# Patient Record
Sex: FEMALE | Race: WHITE | NOT HISPANIC OR LATINO | Employment: OTHER | URBAN - METROPOLITAN AREA
[De-identification: names, ages, dates, MRNs, and addresses within clinical notes are randomized per-mention and may not be internally consistent; named-entity substitution may affect disease eponyms.]

---

## 2017-07-05 DIAGNOSIS — I65.23 CAROTID STENOSIS, BILATERAL: Primary | ICD-10-CM

## 2017-10-06 ENCOUNTER — OFFICE VISIT (OUTPATIENT)
Dept: CARDIOLOGY | Facility: CLINIC | Age: 72
End: 2017-10-06

## 2017-10-06 ENCOUNTER — DOCUMENTATION (OUTPATIENT)
Dept: CARDIOLOGY | Facility: CLINIC | Age: 72
End: 2017-10-06

## 2017-10-06 ENCOUNTER — HOSPITAL ENCOUNTER (OUTPATIENT)
Dept: CARDIOLOGY | Facility: HOSPITAL | Age: 72
Discharge: HOME | End: 2017-10-06
Attending: INTERNAL MEDICINE | Admitting: INTERNAL MEDICINE

## 2017-10-06 VITALS
WEIGHT: 116.2 LBS | HEART RATE: 68 BPM | HEIGHT: 65 IN | DIASTOLIC BLOOD PRESSURE: 70 MMHG | BODY MASS INDEX: 19.36 KG/M2 | SYSTOLIC BLOOD PRESSURE: 160 MMHG

## 2017-10-06 DIAGNOSIS — I73.9 PERIPHERAL VASCULAR DISEASE (HCC): ICD-10-CM

## 2017-10-06 DIAGNOSIS — I11.9 HYPERTENSIVE HEART DISEASE WITHOUT HEART FAILURE: Primary | ICD-10-CM

## 2017-10-06 DIAGNOSIS — I65.23 CAROTID STENOSIS, BILATERAL: ICD-10-CM

## 2017-10-06 DIAGNOSIS — I10 ESSENTIAL HYPERTENSION: ICD-10-CM

## 2017-10-06 DIAGNOSIS — R00.2 PALPITATIONS: ICD-10-CM

## 2017-10-06 DIAGNOSIS — E78.5 DYSLIPIDEMIA: ICD-10-CM

## 2017-10-06 DIAGNOSIS — Z72.0 TOBACCO ABUSE: ICD-10-CM

## 2017-10-06 LAB
BH CV XLRA MEAS LEFT DIST CCA EDV: 19 CM/SEC
BH CV XLRA MEAS LEFT DIST CCA PSV: 74 CM/SEC
BH CV XLRA MEAS LEFT DIST ICA EDV: 23 CM/SEC
BH CV XLRA MEAS LEFT DIST ICA PSV: 81 CM/SEC
BH CV XLRA MEAS LEFT ICA/CCA RATIO: 1.4
BH CV XLRA MEAS LEFT MID ICA EDV: 29 CM/SEC
BH CV XLRA MEAS LEFT MID ICA PSV: 102 CM/SEC
BH CV XLRA MEAS LEFT PROX CCA EDV: 17 CM/SEC
BH CV XLRA MEAS LEFT PROX CCA PSV: 95 CM/SEC
BH CV XLRA MEAS LEFT PROX ECA EDV: 23 CM/SEC
BH CV XLRA MEAS LEFT PROX ECA PSV: 189 CM/SEC
BH CV XLRA MEAS LEFT PROX ICA EDV: 16 CM/SEC
BH CV XLRA MEAS LEFT PROX ICA PSV: 92 CM/SEC
BH CV XLRA MEAS LEFT PROX SCLA EDV: 1 CM/SEC
BH CV XLRA MEAS LEFT PROX SCLA PSV: 134 CM/SEC
BH CV XLRA MEAS LEFT VERTEBRAL A EDV: 18 CM/SEC
BH CV XLRA MEAS LEFT VERTEBRAL A PSV: 63 CM/SEC
BH CV XLRA MEAS RIGHT DIST CCA EDV: 15 CM/SEC
BH CV XLRA MEAS RIGHT DIST CCA PSV: 78 CM/SEC
BH CV XLRA MEAS RIGHT DIST ICA EDV: 27 CM/SEC
BH CV XLRA MEAS RIGHT DIST ICA PSV: 113 CM/SEC
BH CV XLRA MEAS RIGHT ICA/CCA RATIO: 1.4
BH CV XLRA MEAS RIGHT MID ICA EDV: 18 CM/SEC
BH CV XLRA MEAS RIGHT MID ICA PSV: 80 CM/SEC
BH CV XLRA MEAS RIGHT PROX CCA EDV: 13 CM/SEC
BH CV XLRA MEAS RIGHT PROX CCA PSV: 99 CM/SEC
BH CV XLRA MEAS RIGHT PROX ECA EDV: 17 CM/SEC
BH CV XLRA MEAS RIGHT PROX ECA PSV: 129 CM/SEC
BH CV XLRA MEAS RIGHT PROX ICA EDV: 20 CM/SEC
BH CV XLRA MEAS RIGHT PROX ICA PSV: 113 CM/SEC
BH CV XLRA MEAS RIGHT PROX SCLA EDV: 1 CM/SEC
BH CV XLRA MEAS RIGHT PROX SCLA PSV: 117 CM/SEC
BH CV XLRA MEAS RIGHT VERTEBRAL A EDV: 11 CM/SEC
BH CV XLRA MEAS RIGHT VERTEBRAL A PSV: 66 CM/SEC

## 2017-10-06 PROCEDURE — 93880 EXTRACRANIAL BILAT STUDY: CPT | Performed by: INTERNAL MEDICINE

## 2017-10-06 PROCEDURE — 93880 EXTRACRANIAL BILAT STUDY: CPT

## 2017-10-06 PROCEDURE — 99214 OFFICE O/P EST MOD 30 MIN: CPT | Performed by: INTERNAL MEDICINE

## 2017-10-06 RX ORDER — METOPROLOL SUCCINATE 50 MG/1
50 TABLET, EXTENDED RELEASE ORAL DAILY
COMMUNITY

## 2017-10-06 NOTE — PROGRESS NOTES
Subjective:     Encounter Date:10/06/2017    Patient ID: Rebeka Cummings is a 72 y.o.  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      SELF-REFERRED  INTERNIST: Sam Koroma MD  ALLERGIST: Julio Aranda MD    Chief Complaint:   Chief Complaint   Patient presents with   • Hypertension     Problem List:  1. Probable hypertensive cardiovascular disease:  a. Remote acceptable echocardiogram [facility], January 2011, with recent labile hypertensive blood pressure readings.   b. Remote acceptable echocardiogram with GXT declined (LVEF 0.65), July 2012.  c. Residual class I symptoms with intermittent palpitation and acceptable ZIO/XT monitor (December 2016).  2. Chronic hypertension -- probably essential, recent labile readings.  3. Dyslipidemia.   4. Remote tobacco abuse discontinued in 2010, with recurrence, March 2013.   5. Peripheral vascular disease:  a. Asymptomatic carotid bruits more notable on the left with remote abnormal carotid duplex study suggestive of possible severe LIC disease [facility], January 2011.  b. Remote abnormal carotid duplex demonstrating mild (20% to 40%) left ICA origin stenosis, July 2012.  c. Apparent stable by report, 09/24/2014.   d. Acceptable stable carotid duplex study, October 2017.  6. Remote anxiety with recent multiple family stressors, resolved.   7. Pelvis fracture in March 2015.   8. Remote operations:  a. Bilateral tubal ligation.  b. Minor dermatologic superficial/benign excisions.    Allergies   Allergen Reactions   • Crestor [Rosuvastatin Calcium] Other (See Comments) and Myalgia     Arthralgia     • Lipitor [Atorvastatin] Other (See Comments)     Dysphoria/memory problems   • Other Nausea And Vomiting     ANTIBIOTICS; multiple food and environmental allergies   • Pravastatin Myalgia   • Prednisone Nausea And Vomiting   • Demadex [Torsemide] Rash and Other (See Comments)     epistaxis   •  Penicillins Nausea And Vomiting and Rash   • Sulfa Antibiotics Nausea Only and Rash         Current Outpatient Prescriptions:   •  aspirin 81 MG tablet, Take 81 mg by mouth 3 (Three) Times a Week. Takes on Monday, Wednesday, & Friday, Disp: , Rfl:   •  lisinopril (PRINIVIL,ZESTRIL) 20 MG tablet, Take 20 mg by mouth Daily., Disp: , Rfl: 5  •  LORazepam (ATIVAN) 0.5 MG tablet, Take  by mouth 2 (Two) Times a Day., Disp: , Rfl:   •  metoprolol succinate XL (TOPROL-XL) 50 MG 24 hr tablet, Take 50 mg by mouth Daily., Disp: , Rfl:     HISTORY OF PRESENT ILLNESS: Patient returns for scheduled annual followup. She has done well since last being seen in our office.  She states that she has done well on the doubled dose of metoprolol succinate XL.  Her blood pressure is elevated today in our office and was also elevated when she had her carotid duplex done before her appointment, but she says that when she checked it at home this morning, her systolic reading was 134.  In the interim, she started having intermittent palpitation, so a ZIO/XT patch was ordered for her, and those results were good, as discussed with her by letter and today in the office.  The patient does not typically get influenza immunization, and the importance of this immunization is discussed with her today.  She hand carries laboratory results with her today, and these are reviewed with her in the office (see below).  She is told that we will send her a letter with the results of her carotid ultrasound.  She is leaving a week from tomorrow (10/14/2017) with her daughter and her sister for a trip to [place] and would like to be sure she is okay before they leave; she is assured that she will have our letter by that time outlining the results of her carotid ultrasound, but she is told that the preliminary results look good. The patient continues to smoke on a daily basis and is encouraged to completely stop.  She states that she had her feet checked, and  "that all looked good; she says that if we would like the report, they will send it to us.  Patient otherwise denies chest pain, shortness of breath, PND, edema, palpitations, syncope or presyncope at this time.          Review of Systems   Hematologic/Lymphatic: Bruises/bleeds easily.   Skin: Positive for dry skin.   Psychiatric/Behavioral: The patient is nervous/anxious.    Allergic/Immunologic: Positive for environmental allergies.        Food allergies      Obtained and otherwise negative except as outlined in problem list and HPI.    Procedures       Objective:       Vitals:    10/06/17 1246 10/06/17 1254 10/06/17 1304   BP: (!) 184/70 176/68 160/70   BP Location: Left arm Left arm Left arm   Patient Position: Sitting Standing Sitting   Pulse: 68     Weight: 116 lb 3.2 oz (52.7 kg)     Height: 65\" (165.1 cm)       Body mass index is 19.34 kg/(m^2).   Last weight:  115 lbs.    Physical Exam   Constitutional: She is oriented to person, place, and time. She appears well-developed and well-nourished.   Neck: No JVD present. Carotid bruit is present (bilateral). No thyromegaly present.   Cardiovascular: Regular rhythm, S1 normal and S2 normal.  Exam reveals no gallop, no S3 and no friction rub.    Murmur heard.   Medium-pitched early systolic murmur is present with a grade of 2/6  at the lower left sternal border  Pulses:       Carotid pulses are 1+ on the right side, and 1+ on the left side.       Radial pulses are 1+ on the right side, and 1+ on the left side.        Femoral pulses are 1+ on the right side, and 1+ on the left side.       Popliteal pulses are 1+ on the right side, and 1+ on the left side.        Dorsalis pedis pulses are 1+ on the right side, and 1+ on the left side.        Posterior tibial pulses are 1+ on the right side, and 1+ on the left side.   Pulmonary/Chest: Effort normal and breath sounds normal. She has no wheezes. She has no rhonchi. She has no rales.   Abdominal: Soft. She exhibits no " mass. There is no hepatosplenomegaly. There is no tenderness. There is no guarding.   Bowel sounds audible x4   Musculoskeletal: Normal range of motion. She exhibits no edema.   Lymphadenopathy:     She has no cervical adenopathy.   Neurological: She is alert and oriented to person, place, and time.   Skin: Skin is warm, dry and intact. No rash noted.   Vitals reviewed.        Lab Review: June 2017 (reviewed with patient in office)  · CMP - glucose 89, BUN 10, creatinine 0.69, GFR >60, sodium 135, potassium 4.7, calcium 10.1, protein 8.1, albumin 4.6 bilirubin 0.8  · Uric Acid - 4.5 mg/dL  · FLP - total cholesterol 161, triglycerides 101, HDL-C 45, LDL-C 96, non-HDL-C 116  · TSH - 1.25  · CBC - WBC 9.54        Assessment:   Overall continued acceptable course with no interim cardiopulmonary complaints with acceptable functional status. We will defer additional diagnostic or therapeutic intervention from a cardiac perspective at this time.       Diagnosis Plan   1. Hypertensive heart disease without heart failure  No recurrent angina pectoris or CHF.     2. Palpitations  Holter / Event ZIO Patch; performed 11/2016   3. Essential hypertension  Labile readings; will obtain 24-hour ambulatory blood pressure monitor   4. Peripheral vascular disease  Stable abnormal carotid duplex study   5. Dyslipidemia  Marginal control   6. Tobacco abuse  Total cessation strongly encouraged; declines Chantix          Plan:         1. Patient to continue current medications and close follow up with the above providers.  2. Complete smoking cessation is strongly encouraged.  3. Patient will continue to monitor her blood pressure at home and let us know if it is consistently elevated; she will return in 3-4 weeks for consideration of 24-hour ambulatory blood pressure monitor.  4. Tentative cardiology follow up in October 2018, or patient may return sooner PRN.       Transcribed by Torrie Garay for Dr. Rico Vila at 12:55 PM on  10/06/2017    I, Rico Vila MD, [facility], personally performed the services described in this documentation as scribed by the above named individual in my presence, and it is both accurate and complete. At 1:46 PM on 10/06/2017

## 2018-10-12 ENCOUNTER — OFFICE VISIT (OUTPATIENT)
Dept: CARDIOLOGY | Facility: CLINIC | Age: 73
End: 2018-10-12

## 2018-10-12 VITALS
SYSTOLIC BLOOD PRESSURE: 134 MMHG | HEART RATE: 78 BPM | WEIGHT: 112 LBS | BODY MASS INDEX: 18.66 KG/M2 | DIASTOLIC BLOOD PRESSURE: 62 MMHG | HEIGHT: 65 IN

## 2018-10-12 DIAGNOSIS — I11.9 HYPERTENSIVE HEART DISEASE WITHOUT HEART FAILURE: ICD-10-CM

## 2018-10-12 DIAGNOSIS — Z72.0 TOBACCO ABUSE: ICD-10-CM

## 2018-10-12 DIAGNOSIS — I65.23 ATHEROSCLEROSIS OF BOTH CAROTID ARTERIES: Primary | ICD-10-CM

## 2018-10-12 DIAGNOSIS — I10 ESSENTIAL HYPERTENSION: ICD-10-CM

## 2018-10-12 DIAGNOSIS — E78.5 DYSLIPIDEMIA: ICD-10-CM

## 2018-10-12 PROCEDURE — 99214 OFFICE O/P EST MOD 30 MIN: CPT | Performed by: INTERNAL MEDICINE

## 2018-10-12 RX ORDER — CETIRIZINE HYDROCHLORIDE 10 MG/1
10 TABLET ORAL AS NEEDED
COMMUNITY
End: 2019-10-30

## 2018-10-12 NOTE — PROGRESS NOTES
Subjective:     Encounter Date:10/12/2018    Patient ID: Rebeka Cummings is a 73 y.o.  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      SELF-REFERRED  INTERNIST: Sam Koroma MD  ALLERGIST: Julio Aranda MD    Chief Complaint:   Chief Complaint   Patient presents with   • Hypertension     Problem List:  1. Probable hypertensive cardiovascular disease:  a. Remote acceptable echocardiogram [facility], January 2011, with recent labile hypertensive blood pressure readings.   b. Remote acceptable echocardiogram with GXT declined (LVEF 0.65), July 2012.  c. Residual class I symptoms with intermittent palpitation and acceptable ZIO/XT monitor (December 2016).  2. Chronic hypertension -- probably essential, recent labile readings.  3. Dyslipidemia.   4. Remote tobacco abuse discontinued in 2010, with recurrence, March 2013.   5. Peripheral vascular disease:  a. Asymptomatic carotid bruits more notable on the left with remote abnormal carotid duplex study suggestive of possible severe LIC disease [facility], January 2011.  b. Remote abnormal carotid duplex demonstrating mild (20% to 40%) left ICA origin stenosis, July 2012.  c. Apparent stable by report, 09/24/2014.   d. Acceptable stable carotid duplex study, October 2017.  6. Remote anxiety with multiple family stressors, resolved.   7. Pelvis fracture in March 2015.   8. Remote operations:  a. Bilateral tubal ligation.  b. Minor dermatologic superficial/benign excisions.    Allergies   Allergen Reactions   • Crestor [Rosuvastatin Calcium] Other (See Comments) and Myalgia     Arthralgia     • Lipitor [Atorvastatin] Other (See Comments)     Dysphoria/memory problems   • Other Nausea And Vomiting     ANTIBIOTICS; multiple food and environmental allergies   • Pravastatin Myalgia   • Prednisone Nausea And Vomiting   • Demadex [Torsemide] Rash and Other (See Comments)     epistaxis   • Penicillins  "Nausea And Vomiting and Rash   • Sulfa Antibiotics Nausea Only and Rash         Current Outpatient Prescriptions:   •  aspirin 81 MG tablet, Take 81 mg by mouth 3 (Three) Times a Week. Takes on Monday, Wednesday, & Friday, Disp: , Rfl:   •  cetirizine (zyrTEC) 10 MG tablet, Take 10 mg by mouth As Needed for Allergies., Disp: , Rfl:   •  lisinopril (PRINIVIL,ZESTRIL) 20 MG tablet, Take 20 mg by mouth Daily., Disp: , Rfl: 5  •  LORazepam (ATIVAN) 0.5 MG tablet, Take  by mouth 2 (Two) Times a Day., Disp: , Rfl:   •  metoprolol succinate XL (TOPROL-XL) 50 MG 24 hr tablet, Take 50 mg by mouth Daily., Disp: , Rfl:     HISTORY OF PRESENT ILLNESS: Patient returns for scheduled annual followup.  She has done well since last being seen in our office.  She stays busy and says \"I can't just sit down.\"  She sometimes does not even sit down to eat.  She continues to work with her  at their business.  She did not come back for a blood pressure monitor, and she says \"it's always perfect\" when she checks it at home.  She says it typically runs 125-130 systolic at home.  She has no symptoms from a cardiopulmonary perspective with her daily activities.  She had lab work drawn in Dr. Koroma's office over the summer and asked them to send those results to us; however, we have not received anything.  She will call their office and ask them to forward the results to our office.  The patient states that she was told \"everything was good.\"  She has not had a flu shot and \"never has.\"  The importance of influenza immunization is discussed with her.  The patient notes that she has a lot of stress in her life and that \"it doesn't take much to get me stressed.\"  She states that her sister-in-law  a couple of weeks ago in [place] so they had to travel for that, and her 69-year-old sister just recently had heart failure with placement of an AICD and also needs to have surgery for a kidney stone and has recently undergo " "catheterization studies with 2 stents placed.  Patient otherwise denies chest pain, shortness of breath, PND, edema, palpitations, syncope or presyncope at this time.        Review of Systems   Hematologic/Lymphatic: Bruises/bleeds easily.   Skin: Positive for dry skin, itching and rash.   Allergic/Immunologic: Positive for environmental allergies (food allergies).      Obtained and otherwise negative except as outlined in problem list and HPI.    Procedures       Objective:       Vitals:    10/12/18 1245 10/12/18 1252 10/12/18 1307   BP: 161/60 159/65 134/62   BP Location: Right arm Right arm Left arm   Patient Position: Sitting Standing Sitting   Pulse: 73 78    Weight: 50.8 kg (112 lb)     Height: 165.1 cm (65\")       Body mass index is 18.64 kg/m².   Last weight:  116 lbs.    Physical Exam   Constitutional: She is oriented to person, place, and time. She appears well-developed and well-nourished.   Neck: No JVD present. Carotid bruit is present (faint, left). No thyromegaly present.   Cardiovascular: Regular rhythm, S1 normal, S2 normal and normal heart sounds.  Exam reveals no gallop, no S3 and no friction rub.    No murmur heard.  Pulses:       Dorsalis pedis pulses are 2+ on the right side, and 2+ on the left side.        Posterior tibial pulses are 2+ on the right side, and 2+ on the left side.   Pulmonary/Chest: Effort normal. She has decreased breath sounds. She has no wheezes. She has no rhonchi. She has no rales.   Abdominal: Soft. She exhibits no mass. There is no hepatosplenomegaly. There is no tenderness. There is no guarding.   Bowel sounds audible x4   Musculoskeletal: Normal range of motion. She exhibits no edema.   Lymphadenopathy:     She has no cervical adenopathy.   Neurological: She is alert and oriented to person, place, and time.   Skin: Skin is warm, dry and intact. No rash noted.   Vitals reviewed.        Lab Review: No recent laboratory results available for review today    Carotid duplex " scan, 10/06/2017:    · Proximal right internal carotid artery plaque without significant stenosis.  · Right internal carotid artery stenosis of 0-49%.  · Proximal left internal carotid artery plaque without significant stenosis.  · Left internal carotid artery stenosis of 0-49%.          Assessment:   Overall continued acceptable course with no interim cardiopulmonary complaints with good functional status. We will defer additional diagnostic or therapeutic intervention from a cardiac perspective at this time.  Hopefully, we will be allowed to obtain and review her most recent laboratory results with her by letter.  She is to continue to monitor her blood pressure at home and let us know if her blood pressure consistently is at or above 140 torr systolic.       Diagnosis Plan   1. Atherosclerosis of both carotid arteries  Duplex Carotid Ultrasound CAR   2. Essential hypertension  Labile readings with overall acceptable control   3. Hypertensive heart disease without heart failure  No recurrent angina pectoris or CHF; continue current treatment     4. Dyslipidemia  No data to reivew   5. Tobacco abuse  I advised the patient of the risks of continuing to use tobacco, and I provided this patient with smoking cessation educational materials and discussed how to quit smoking and patient has not expressed the willingness to quit.  Counseled patient for 3-5 minutes            Plan:         1. Patient to continue current medications and close follow up with the above providers.  2. Influenza immunization is strongly recommended.  3. Complete smoking cessation is strongly encouraged.  4. Tentative cardiology follow up in October 2019 with carotid duplex study, or patient may return sooner PRN.    Transcribed by Torrie Garay for Dr. Rico Vila at 1:02 PM on 10/12/2018    IRico MD, [facility], personally performed the services described in this documentation as scribed by the above named individual in my  presence, and it is both accurate and complete. At 1:31 PM on 10/12/2018

## 2019-04-12 ENCOUNTER — TELEPHONE (OUTPATIENT)
Dept: CARDIOLOGY | Facility: CLINIC | Age: 74
End: 2019-04-12

## 2019-04-12 RX ORDER — TRIAMTERENE AND HYDROCHLOROTHIAZIDE 37.5; 25 MG/1; MG/1
TABLET ORAL
Qty: 30 TABLET | Refills: 11 | Status: SHIPPED | OUTPATIENT
Start: 2019-04-12 | End: 2019-10-30

## 2019-04-12 NOTE — TELEPHONE ENCOUNTER
Pt has had trouble with her BP since Jan. She went to the ED on 01/05/2019 and was put on Clonidine at time then switched to Amlodipine from the PCP. Amlodipine has been adjusted and currently takes 5 mg with controled BP & HR.      04/11  /65  HR 60  04/12  /61  HR 65    Updated List of Medication:    Amlodipine 5mg QD  Lisinopril 20 mg QD  Metoprolol 50 Mg QD  ASA 81 mg QD  Ativan 0.5 mg BID  Paxel 10 mg QD    Pt Now c/o Edema to the Lower Extremities.     Pt advised to continue to record BP and HR and Weight.

## 2019-06-17 DIAGNOSIS — I65.23 ATHEROSCLEROSIS OF BOTH CAROTID ARTERIES: Primary | ICD-10-CM

## 2019-10-30 ENCOUNTER — HOSPITAL ENCOUNTER (OUTPATIENT)
Dept: CARDIOLOGY | Facility: HOSPITAL | Age: 74
Discharge: HOME | End: 2019-10-30
Admitting: INTERNAL MEDICINE

## 2019-10-30 ENCOUNTER — OFFICE VISIT (OUTPATIENT)
Dept: CARDIOLOGY | Facility: CLINIC | Age: 74
End: 2019-10-30

## 2019-10-30 VITALS
HEART RATE: 57 BPM | BODY MASS INDEX: 18.33 KG/M2 | DIASTOLIC BLOOD PRESSURE: 60 MMHG | HEIGHT: 65 IN | SYSTOLIC BLOOD PRESSURE: 126 MMHG | WEIGHT: 110 LBS

## 2019-10-30 DIAGNOSIS — I10 ESSENTIAL HYPERTENSION: ICD-10-CM

## 2019-10-30 DIAGNOSIS — I11.9 HYPERTENSIVE HEART DISEASE WITHOUT HEART FAILURE: Primary | ICD-10-CM

## 2019-10-30 DIAGNOSIS — I73.9 PERIPHERAL VASCULAR DISEASE (HCC): ICD-10-CM

## 2019-10-30 DIAGNOSIS — Z72.0 TOBACCO ABUSE: ICD-10-CM

## 2019-10-30 DIAGNOSIS — E78.5 DYSLIPIDEMIA: ICD-10-CM

## 2019-10-30 DIAGNOSIS — I65.23 ATHEROSCLEROSIS OF BOTH CAROTID ARTERIES: ICD-10-CM

## 2019-10-30 LAB
BH CV ECHO MEAS - BSA(HAYCOCK): 1.5 M^2
BH CV ECHO MEAS - BSA: 1.5 M^2
BH CV ECHO MEAS - BZI_BMI: 18.6 KILOGRAMS/M^2
BH CV ECHO MEAS - BZI_METRIC_HEIGHT: 165.1 CM
BH CV ECHO MEAS - BZI_METRIC_WEIGHT: 50.8 KG
BH CV XLRA MEAS LEFT BULB EDV: 20.4 CM/SEC
BH CV XLRA MEAS LEFT BULB PSV: 123.4 CM/SEC
BH CV XLRA MEAS LEFT CCA RATIO VEL: 69.5 CM/SEC
BH CV XLRA MEAS LEFT DIST CCA EDV: 14.3 CM/SEC
BH CV XLRA MEAS LEFT DIST CCA PSV: 69.5 CM/SEC
BH CV XLRA MEAS LEFT DIST ICA EDV: 19.6 CM/SEC
BH CV XLRA MEAS LEFT DIST ICA PSV: 82.5 CM/SEC
BH CV XLRA MEAS LEFT ICA RATIO VEL: 135 CM/SEC
BH CV XLRA MEAS LEFT ICA/CCA RATIO: 1.9
BH CV XLRA MEAS LEFT MID CCA EDV: 13.8 CM/SEC
BH CV XLRA MEAS LEFT MID CCA PSV: 70 CM/SEC
BH CV XLRA MEAS LEFT MID ICA EDV: 17.7 CM/SEC
BH CV XLRA MEAS LEFT MID ICA PSV: 97.2 CM/SEC
BH CV XLRA MEAS LEFT PROX CCA EDV: 13.8 CM/SEC
BH CV XLRA MEAS LEFT PROX CCA PSV: 82.7 CM/SEC
BH CV XLRA MEAS LEFT PROX ECA EDV: 17.7 CM/SEC
BH CV XLRA MEAS LEFT PROX ECA PSV: 185.6 CM/SEC
BH CV XLRA MEAS LEFT PROX ICA EDV: 22.6 CM/SEC
BH CV XLRA MEAS LEFT PROX ICA PSV: 135.5 CM/SEC
BH CV XLRA MEAS LEFT PROX SCLA PSV: 158.3 CM/SEC
BH CV XLRA MEAS LEFT VERTEBRAL A EDV: 11.6 CM/SEC
BH CV XLRA MEAS LEFT VERTEBRAL A PSV: 61.2 CM/SEC
BH CV XLRA MEAS RIGHT BULB EDV: 15.7 CM/SEC
BH CV XLRA MEAS RIGHT BULB PSV: 109.4 CM/SEC
BH CV XLRA MEAS RIGHT CCA RATIO VEL: 72.9 CM/SEC
BH CV XLRA MEAS RIGHT DIST CCA EDV: 10.3 CM/SEC
BH CV XLRA MEAS RIGHT DIST CCA PSV: 85 CM/SEC
BH CV XLRA MEAS RIGHT DIST ICA EDV: 20.7 CM/SEC
BH CV XLRA MEAS RIGHT DIST ICA PSV: 111.3 CM/SEC
BH CV XLRA MEAS RIGHT ICA RATIO VEL: 126 CM/SEC
BH CV XLRA MEAS RIGHT ICA/CCA RATIO: 1.7
BH CV XLRA MEAS RIGHT MID CCA EDV: 12.7 CM/SEC
BH CV XLRA MEAS RIGHT MID CCA PSV: 73.3 CM/SEC
BH CV XLRA MEAS RIGHT MID ICA EDV: 18.9 CM/SEC
BH CV XLRA MEAS RIGHT MID ICA PSV: 111.3 CM/SEC
BH CV XLRA MEAS RIGHT PROX CCA EDV: 10.9 CM/SEC
BH CV XLRA MEAS RIGHT PROX CCA PSV: 61.6 CM/SEC
BH CV XLRA MEAS RIGHT PROX ECA EDV: 9.4 CM/SEC
BH CV XLRA MEAS RIGHT PROX ECA PSV: 117.9 CM/SEC
BH CV XLRA MEAS RIGHT PROX ICA EDV: 21.4 CM/SEC
BH CV XLRA MEAS RIGHT PROX ICA PSV: 127 CM/SEC
BH CV XLRA MEAS RIGHT PROX SCLA PSV: 112.4 CM/SEC
BH CV XLRA MEAS RIGHT VERTEBRAL A EDV: 13.8 CM/SEC
BH CV XLRA MEAS RIGHT VERTEBRAL A PSV: 52.1 CM/SEC
LEFT ARM BP: NORMAL MMHG
RIGHT ARM BP: NORMAL MMHG

## 2019-10-30 PROCEDURE — 93880 EXTRACRANIAL BILAT STUDY: CPT | Performed by: INTERNAL MEDICINE

## 2019-10-30 PROCEDURE — 99214 OFFICE O/P EST MOD 30 MIN: CPT | Performed by: INTERNAL MEDICINE

## 2019-10-30 PROCEDURE — 93880 EXTRACRANIAL BILAT STUDY: CPT

## 2019-10-30 RX ORDER — PAROXETINE 10 MG/1
10 TABLET, FILM COATED ORAL DAILY
Refills: 3 | COMMUNITY
Start: 2019-09-25
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
## 2019-10-31 ENCOUNTER — TELEPHONE (OUTPATIENT)
Dept: CARDIOLOGY | Facility: CLINIC | Age: 74
End: 2019-10-31

## 2019-10-31 NOTE — TELEPHONE ENCOUNTER
Per [facility], if pt takes Xarelto there is a 60% decrease in chance or stroke. Her plaque is more diffuse and can not be quantified.    Pt given [facility] recommendations above. Pt educated to stop smoking and advised of the dcrease in stroke risk.    Pt is still undecided about taking Xarelto and states she will call and let us know.

## 2019-10-31 NOTE — TELEPHONE ENCOUNTER
Called pt regarding carotid duplex result letter and starting Xarelto.     Pt would like to know how this carotid duplex study compares to the carotid duplex that she had in July 2017, and if starting Xarelto was 100% neccessary. Pt states that she already bleeds very easily on ASA 81mg daily and is hesitant to start Xarelto.    Please advise.

## 2019-10-31 NOTE — TELEPHONE ENCOUNTER
"Called pt and gave [facility]TS recommendations above.    Pt states that she does not want to take Xarelto. Educated pt on reasons and necessity of medication.    Pt states she will \"think about it\". Advised pt that she should start the medication as prescribed by [facility]TS.    Pt verbalizes understanding and says she will let me know if she decides to start Xarelto.      "

## 2019-10-31 NOTE — TELEPHONE ENCOUNTER
Noted; she appears to have more diffuse coronary artery atherosclerosis; I do feel Xarelto provides added protection without that much more increase in bleeding risk.    Thanks!

## 2021-02-26 ENCOUNTER — OFFICE VISIT (OUTPATIENT)
Dept: CARDIOLOGY | Facility: CLINIC | Age: 76
End: 2021-02-26

## 2021-02-26 VITALS
OXYGEN SATURATION: 99 % | DIASTOLIC BLOOD PRESSURE: 80 MMHG | SYSTOLIC BLOOD PRESSURE: 192 MMHG | HEART RATE: 65 BPM | HEIGHT: 65 IN | WEIGHT: 105 LBS | BODY MASS INDEX: 17.49 KG/M2

## 2021-02-26 DIAGNOSIS — I11.9 HYPERTENSIVE HEART DISEASE WITHOUT HEART FAILURE: Primary | ICD-10-CM

## 2021-02-26 DIAGNOSIS — Z72.0 TOBACCO ABUSE: ICD-10-CM

## 2021-02-26 DIAGNOSIS — I73.9 PERIPHERAL VASCULAR DISEASE (HCC): ICD-10-CM

## 2021-02-26 DIAGNOSIS — I10 ESSENTIAL HYPERTENSION: ICD-10-CM

## 2021-02-26 DIAGNOSIS — E78.5 DYSLIPIDEMIA: ICD-10-CM

## 2021-02-26 PROCEDURE — 99214 OFFICE O/P EST MOD 30 MIN: CPT | Performed by: INTERNAL MEDICINE

## 2021-02-26 RX ORDER — HYDRALAZINE HYDROCHLORIDE 10 MG/1
TABLET, FILM COATED ORAL
COMMUNITY
Start: 2021-02-23

## 2021-02-26 RX ORDER — IRBESARTAN 300 MG/1
TABLET ORAL
COMMUNITY
Start: 2021-02-08

## 2021-02-26 RX ORDER — CLONIDINE HYDROCHLORIDE 0.1 MG/1
TABLET ORAL
COMMUNITY
Start: 2021-02-24

## 2021-02-26 NOTE — PROGRESS NOTES
Subjective:     Encounter Date:02/26/2021    Patient ID: Rebeka Cummings is a 75 y.o.  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      SELF-REFERRED  INTERNIST: Sam Koroma MD  ALLERGIST: Julio Aranda MD  NEPHROLOGIST: in [place]    Chief Complaint:   Chief Complaint   Patient presents with   • Hypertension   • artherosclerosis       Problem List:  1. Probable hypertensive cardiovascular disease:  a. Remote acceptable echocardiogram [facility] [place], January 2011, with recent labile hypertensive blood pressure readings.   b. Remote acceptable echocardiogram with GXT declined (LVEF 0.65), July 2012.  c. Residual class I symptoms with intermittent palpitation and acceptable ZIO/XT monitor (December 2016).  2. Chronic hypertension -- probably essential, recent labile readings.  3. Dyslipidemia.   4. Remote tobacco abuse discontinued in 2010, with recurrence, March 2013, currently smoking 1 pack/day February 2021.   5. Peripheral vascular disease:  a. Asymptomatic carotid bruits more notable on the left with remote abnormal carotid duplex study suggestive of possible severe LIC disease [facility] [place], January 2011.  b. Remote abnormal carotid duplex demonstrating mild (20% to 40%) left ICA origin stenosis, July 2012.  c. Apparent stable by report, 09/24/2014.   d. Acceptable stable carotid duplex study, October 2017, October 2019.  6. Remote anxiety with multiple family stressors, resolved.   7. Pelvis fracture in March 2015.   8. Remote operations:  a. Bilateral tubal ligation.  b. Minor dermatologic superficial/benign excisions.  c. Basal cell carcinoma excision from side of nose, July 2019    Allergies   Allergen Reactions   • Crestor [Rosuvastatin Calcium] Other (See Comments) and Myalgia     Arthralgia     • Lipitor [Atorvastatin] Other (See Comments)     Dysphoria/memory problems   • Other Nausea And Vomiting     ANTIBIOTICS; multiple food  and environmental allergies   • Pravastatin Myalgia   • Prednisone Nausea And Vomiting   • Demadex [Torsemide] Rash and Other (See Comments)     epistaxis   • Penicillins Nausea And Vomiting and Rash   • Sulfa Antibiotics Nausea Only and Rash       Current Outpatient Medications:   •  aspirin 81 MG tablet, Take 81 mg by mouth 3 (Three) Times a Week. Takes on Monday, Wednesday, & Friday, Disp: , Rfl:   •  cloNIDine (CATAPRES) 0.1 MG tablet, , Disp: , Rfl:   •  hydrALAZINE (APRESOLINE) 10 MG tablet, , Disp: , Rfl:   •  irbesartan (AVAPRO) 300 MG tablet, , Disp: , Rfl:   •  LORazepam (ATIVAN) 0.5 MG tablet, Take  by mouth 2 (Two) Times a Day., Disp: , Rfl:   •  metoprolol succinate XL (TOPROL-XL) 50 MG 24 hr tablet, Take 50 mg by mouth Daily., Disp: , Rfl:   •  PARoxetine (PAXIL) 10 MG tablet, Take 10 mg by mouth Daily., Disp: , Rfl: 3    History of Present Illness: Patient returns after 15-month hiatus for follow up.  She denies any hospitalizations or new diagnoses since she was last seen but she had Mohs surgery on her nose.  The patient is still smoking 1 pack/day but states that she is very stressed out with COVID-19 and she smokes to reduce stress.  She is always active at her home and continually is walking and doing activities.  She denies any chest pain, shortness of breath, palpitations, dizziness, presyncope, or syncope.  Occasionally she will have some pedal edema but not frequently.  She was just started on clonidine 0.1 mg daily 2 days ago and since she has been on this medication, she has noticed that her blood pressure has been in the 130s systolic.  She is supposed to see a nephrologist in [place] on 3/24/2021. She had recent laboratory testing and asked for the results to be sent to us for review but there are none to review as of yet.  She has not had any Covid vaccinations and had questions whether she needed to get this because she has multiple allergies and she was assured that she should.  "Patient has had no additional interim ER visits, hospitalizations, serious illnesses, or surgeries.        Review of Systems   All other systems reviewed and are negative.     Obtained and negative except as outlined in problem list and HPI.    Procedures       Objective:       Vitals:    02/26/21 1238 02/26/21 1251   BP: (!) 189/81 (!) 192/80   BP Location: Right arm Right arm   Patient Position: Sitting Standing   Pulse: 63 65   SpO2: 99%    Weight: 47.6 kg (105 lb)    Height: 165.1 cm (65\")    Recheck blood pressure right arm sitting was 164/64  Body mass index is 17.47 kg/m².  Last weight: 110 lbs    Vitals signs reviewed.   Constitutional:       Appearance: Well-developed.   Neck:      Thyroid: No thyromegaly.      Vascular: Carotid bruit (bilateral) present. No JVD.      Lymphadenopathy: No cervical adenopathy.   Pulmonary:      Effort: Pulmonary effort is normal.      Breath sounds: Normal breath sounds. No wheezing. No rhonchi. No rales.   Cardiovascular:      Regular rhythm.      Murmurs: There is a grade 1/6 mid frequency harsh early systolic murmur at the LLSB.      No gallop. No S3 gallop.   Pulses:     Dorsalis pedis: 1+ bilaterally.     Posterior tibial: 1+ bilaterally.  Edema:     Peripheral edema absent.   Abdominal:      Palpations: Abdomen is soft. There is no abdominal mass.      Tenderness: There is no abdominal tenderness. There is no guarding.   Musculoskeletal: Normal range of motion.   Skin:     General: Skin is warm and dry.      Findings: No rash.      Comments: Marked tobacco smell   Neurological:      Mental Status: Alert and oriented to person, place, and time.           Lab Review:     07/09/2020 (reviewed with patient by letter - ensure follow up of serum sodium):  • CBC: hematocrit 45%, hemoglobin 14.8, WBC 7440 and normal indices, platelet count and differential  • CMP: serum sodium 131 with otherwise normal electrolytes with excellent kidney function, serum creatinine 0.6, BUN 8, " glucose 90 and normal serum calcium and liver function tests.   • Serum albumin: 4.5  • FLP: total cholesterol 155, triglycerides 89, HDL-C 42, LDL-C 95  • TSH: acceptable thyroid function  • Uric acid: 3.3        Assessment:       Overall continued acceptable course with no new interim cardiopulmonary complaints with acceptable functional status other than marked increase in blood pressure. She needs renal artery duplex study and hyperadrenal function but will be seeing a nephrologist and we are reluctant to initiate further diagnostic studies at this time; we are basically out of the loop as to her care at this time. We will defer additional diagnostic or therapeutic intervention from a cardiac perspective at this time. I advised the patient of the risks of continuing to use tobacco, and I provided this patient with smoking cessation educational materials and discussed how to quit smoking and patient has not expressed the willingness to quit.  Counseled patient for 3-5 minutes.     Diagnosis Plan   1. Hypertensive heart disease without heart failure  Patient to follow-up with her nephrology appointment 3/24/2021 with a renal artery duplex, continue monitoring blood pressure at home   2. Peripheral vascular disease (CMS/HCC)  No claudication   3. Essential hypertension  Patient to follow-up with her nephrology appointment 3/24/2021 with a renal artery duplex, continue monitoring blood pressure at home   4. Dyslipidemia  No new labs to review   5. Tobacco abuse  Encouraged tobacco cessation          Plan:         1. Patient to continue current medications and close follow up with the above providers.  2. Tentative cardiology follow up in February 2022 or patient may return sooner PRN.  3. Encouraged the patient to follow-up with her nephrologist  4. Encouraged tobacco cessation  5. Continue monitoring blood pressure at home    Scribed for Rico Vila MD by Wendi Sarah, LAUREN. 2/26/2021  13:46 Rico SOTELO  CATHI Vila MD, [facility], personally performed the services described in this documentation as scribed by the above named individual in my presence, and it is both accurate and complete. At 14:00 EST on 02/26/2021